# Patient Record
Sex: MALE | Race: WHITE
[De-identification: names, ages, dates, MRNs, and addresses within clinical notes are randomized per-mention and may not be internally consistent; named-entity substitution may affect disease eponyms.]

---

## 2017-03-17 ENCOUNTER — HOSPITAL ENCOUNTER (OUTPATIENT)
Dept: HOSPITAL 83 - ORTHO | Age: 67
Discharge: HOME | End: 2017-03-17
Attending: ORTHOPAEDIC SURGERY
Payer: MEDICARE

## 2017-03-17 DIAGNOSIS — M25.552: ICD-10-CM

## 2017-03-17 DIAGNOSIS — S72.115D: Primary | ICD-10-CM

## 2017-04-28 ENCOUNTER — HOSPITAL ENCOUNTER (OUTPATIENT)
Dept: HOSPITAL 83 - ORTHO | Age: 67
Discharge: HOME | End: 2017-04-28
Attending: ORTHOPAEDIC SURGERY
Payer: MEDICARE

## 2017-04-28 DIAGNOSIS — G20: ICD-10-CM
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
## 2017-04-28 DIAGNOSIS — S72.92XD: Primary | ICD-10-CM

## 2017-05-01 ENCOUNTER — HOSPITAL ENCOUNTER (EMERGENCY)
Dept: HOSPITAL 83 - ED | Age: 67
Discharge: HOME | End: 2017-05-01
Payer: MEDICARE

## 2017-05-01 VITALS — HEIGHT: 60 IN | WEIGHT: 107 LBS

## 2017-05-01 VITALS — SYSTOLIC BLOOD PRESSURE: 113 MMHG | DIASTOLIC BLOOD PRESSURE: 68 MMHG

## 2017-05-01 DIAGNOSIS — Z79.899: ICD-10-CM

## 2017-05-01 DIAGNOSIS — Z91.041: ICD-10-CM

## 2017-05-01 DIAGNOSIS — G89.29: Primary | ICD-10-CM

## 2017-05-01 DIAGNOSIS — M25.561: ICD-10-CM

## 2017-05-01 DIAGNOSIS — Z91.030: ICD-10-CM

## 2017-11-14 ENCOUNTER — HOSPITAL ENCOUNTER (INPATIENT)
Dept: HOSPITAL 83 - ED | Age: 67
Discharge: ANOTHER HOSPITAL | DRG: 871 | End: 2017-11-14
Attending: INTERNAL MEDICINE | Admitting: INTERNAL MEDICINE
Payer: MEDICARE

## 2017-11-14 VITALS
SYSTOLIC BLOOD PRESSURE: 89 MMHG | BODY MASS INDEX: 18.64 KG/M2 | DIASTOLIC BLOOD PRESSURE: 53 MMHG | HEIGHT: 65.98 IN | WEIGHT: 116 LBS

## 2017-11-14 VITALS — DIASTOLIC BLOOD PRESSURE: 44 MMHG | SYSTOLIC BLOOD PRESSURE: 68 MMHG

## 2017-11-14 VITALS — SYSTOLIC BLOOD PRESSURE: 71 MMHG | DIASTOLIC BLOOD PRESSURE: 44 MMHG

## 2017-11-14 VITALS — DIASTOLIC BLOOD PRESSURE: 48 MMHG

## 2017-11-14 VITALS — DIASTOLIC BLOOD PRESSURE: 53 MMHG | SYSTOLIC BLOOD PRESSURE: 89 MMHG

## 2017-11-14 VITALS — DIASTOLIC BLOOD PRESSURE: 50 MMHG | SYSTOLIC BLOOD PRESSURE: 76 MMHG

## 2017-11-14 VITALS — SYSTOLIC BLOOD PRESSURE: 65 MMHG | DIASTOLIC BLOOD PRESSURE: 42 MMHG

## 2017-11-14 VITALS — DIASTOLIC BLOOD PRESSURE: 60 MMHG

## 2017-11-14 VITALS — DIASTOLIC BLOOD PRESSURE: 63 MMHG | SYSTOLIC BLOOD PRESSURE: 101 MMHG

## 2017-11-14 VITALS — DIASTOLIC BLOOD PRESSURE: 42 MMHG | SYSTOLIC BLOOD PRESSURE: 69 MMHG

## 2017-11-14 VITALS — DIASTOLIC BLOOD PRESSURE: 47 MMHG | SYSTOLIC BLOOD PRESSURE: 87 MMHG

## 2017-11-14 VITALS — SYSTOLIC BLOOD PRESSURE: 73 MMHG | DIASTOLIC BLOOD PRESSURE: 39 MMHG

## 2017-11-14 VITALS — SYSTOLIC BLOOD PRESSURE: 85 MMHG | DIASTOLIC BLOOD PRESSURE: 53 MMHG

## 2017-11-14 VITALS — DIASTOLIC BLOOD PRESSURE: 47 MMHG

## 2017-11-14 VITALS — DIASTOLIC BLOOD PRESSURE: 64 MMHG | SYSTOLIC BLOOD PRESSURE: 106 MMHG

## 2017-11-14 VITALS — SYSTOLIC BLOOD PRESSURE: 84 MMHG | DIASTOLIC BLOOD PRESSURE: 46 MMHG

## 2017-11-14 VITALS — DIASTOLIC BLOOD PRESSURE: 68 MMHG | SYSTOLIC BLOOD PRESSURE: 113 MMHG

## 2017-11-14 VITALS — DIASTOLIC BLOOD PRESSURE: 68 MMHG

## 2017-11-14 VITALS — DIASTOLIC BLOOD PRESSURE: 66 MMHG | SYSTOLIC BLOOD PRESSURE: 114 MMHG

## 2017-11-14 DIAGNOSIS — K52.9: ICD-10-CM

## 2017-11-14 DIAGNOSIS — Z91.041: ICD-10-CM

## 2017-11-14 DIAGNOSIS — E80.6: ICD-10-CM

## 2017-11-14 DIAGNOSIS — K56.41: ICD-10-CM

## 2017-11-14 DIAGNOSIS — Z87.81: ICD-10-CM

## 2017-11-14 DIAGNOSIS — N17.0: ICD-10-CM

## 2017-11-14 DIAGNOSIS — Z83.3: ICD-10-CM

## 2017-11-14 DIAGNOSIS — N18.3: ICD-10-CM

## 2017-11-14 DIAGNOSIS — R65.21: ICD-10-CM

## 2017-11-14 DIAGNOSIS — E83.41: ICD-10-CM

## 2017-11-14 DIAGNOSIS — E53.8: ICD-10-CM

## 2017-11-14 DIAGNOSIS — G89.29: ICD-10-CM

## 2017-11-14 DIAGNOSIS — R73.9: ICD-10-CM

## 2017-11-14 DIAGNOSIS — Z91.030: ICD-10-CM

## 2017-11-14 DIAGNOSIS — D64.9: ICD-10-CM

## 2017-11-14 DIAGNOSIS — R29.6: ICD-10-CM

## 2017-11-14 DIAGNOSIS — N39.0: ICD-10-CM

## 2017-11-14 DIAGNOSIS — Z82.49: ICD-10-CM

## 2017-11-14 DIAGNOSIS — G20: ICD-10-CM

## 2017-11-14 DIAGNOSIS — I95.89: ICD-10-CM

## 2017-11-14 DIAGNOSIS — A41.9: Primary | ICD-10-CM

## 2017-11-14 DIAGNOSIS — R31.9: ICD-10-CM

## 2017-11-14 DIAGNOSIS — G93.41: ICD-10-CM

## 2017-11-14 DIAGNOSIS — Z79.899: ICD-10-CM

## 2017-11-14 LAB
ALBUMIN SERPL-MCNC: 3.3 GM/DL (ref 3.1–4.5)
ALP SERPL-CCNC: 55 U/L (ref 45–117)
ALT SERPL W P-5'-P-CCNC: 11 U/L (ref 12–78)
AMPHETAMINES UR QL SCN: < 1000
APPEARANCE UR: (no result)
AST SERPL-CCNC: 44 IU/L (ref 3–35)
BACTERIA #/AREA URNS HPF: (no result) /[HPF]
BARBITURATES UR QL SCN: < 200
BENZODIAZ UR QL SCN: < 200
BILIRUB UR QL STRIP: (no result)
BUN SERPL-MCNC: 31 MG/DL (ref 7–24)
BZE UR QL SCN: < 300
CANNABINOIDS UR QL SCN: < 50
CASTS URNS QL MICRO: (no result)
CHLORIDE SERPL-SCNC: 106 MMOL/L (ref 98–107)
COLOR UR: YELLOW
CREAT SERPL-MCNC: 2.25 MG/DL (ref 0.7–1.3)
ERYTHROCYTE [DISTWIDTH] IN BLOOD BY AUTOMATED COUNT: 13.1 % (ref 0–14.5)
GLUCOSE UR QL: NEGATIVE
HCT VFR BLD AUTO: 32.6 % (ref 42–52)
HGB BLD-MCNC: 11 G/DL (ref 14–18)
HGB UR QL STRIP: (no result)
INR BLD: 1.2 (ref 2–3.5)
KETONES UR QL STRIP: (no result)
LEUKOCYTE ESTERASE UR QL STRIP: (no result)
LIPASE SERPL-CCNC: 86 U/L (ref 73–393)
MCH RBC QN AUTO: 32 PG (ref 27–31)
MCHC RBC AUTO-ENTMCNC: 33.7 G/DL (ref 33–37)
MCV RBC AUTO: 94.8 FL (ref 80–94)
METHADONE UR QL SCN: < 300
NITRITE UR QL STRIP: POSITIVE
NRBC BLD QL AUTO: 0 10*3/UL (ref 0–0)
OPIATES UR QL SCN: < 300
PCP UR QL SCN: <  25
PH UR STRIP: 5 [PH] (ref 5–9)
PLATELET # BLD AUTO: 153 10*3/UL (ref 130–400)
PLATELET SUFFICIENCY: NORMAL
PMV BLD AUTO: 10.2 FL (ref 9.6–12.3)
POTASSIUM SERPL-SCNC: 4.1 MMOL/L (ref 3.5–5.1)
PROT SERPL-MCNC: 7.2 GM/DL (ref 6.4–8.2)
RBC # BLD AUTO: 3.44 10*6/UL (ref 4.5–5.9)
RBC #/AREA URNS HPF: (no result) RBC/HPF (ref 0–2)
RBC MORPH BLD: NORMAL
SODIUM SERPL-SCNC: 139 MMOL/L (ref 136–145)
SP GR UR: 1.02 (ref 1–1.03)
TOTAL CELLS COUNTED: 100 #CELLS
TROPONIN I SERPL-MCNC: < 0.015 NG/ML (ref ?–0.04)
UROBILINOGEN UR STRIP-MCNC: 2 E.U./DL (ref 0.2–1)
WBC #/AREA URNS HPF: (no result) WBC/HPF (ref 0–5)
WBC NRBC COR # BLD AUTO: 15.7 10*3/UL (ref 4.8–10.8)

## 2017-11-14 NOTE — NUR
RECEIVED CALL FROM [facility] WANTING A HISTORY ON PATIENT, DUE TO HIM BEING
TRANSFERED UP THERE. WAS UNAWARE OF THE TRANSFER, CALLED DR. KIM ON OTHER
LINE. HE STATED YEA TRANSFER PATIENT. PATIENT WAS LYING ON ER CART NOT EVEN IN
ROOM YET. CONTACTED DILCIA, SHE STATED WE HAD TO ADMIT PATIENT THEN TRANSFER.
GAVE NANCY BRIEF HISTORY, AND HE WILL CALL BACK WITH BED.

## 2017-11-14 NOTE — NUR
PATIENT LEFT FLOOR VIA LIFE TEAM, FOR [place] [facility] [place]

## 2017-11-14 NOTE — NUR
A 67 YEAR OLD MALE admitted to ICCU, under the
services of KENNY Arora DO with a diagnosis of SEPSIS RELATED
HYPOTENSION,COLITIS,PARKINSONS.
Chief complaint is ABD PAIN .
Patient arrived via stretcher from ER.
Monitor applied. Initial assessment completed.
Vital signs taken and recorded.
KENNY ARORA DO notified of admission to the unit.
Orders received.
See assessment for past medical history, medications
and allergies.
Patient and/or family oriented to unit. [facility]U
visitation policy reviewed.
Clothing/patient valuable form completed.
 
STEVE BRITTON

## 2017-11-14 NOTE — EKG
[place], [place]
 
                               ELECTROCARDIOGRAM REPORT
 
        NAME: BRITTNY SELLERS                 ACCT #: [id number]  
        UNIT #: [id number]                        ROOM: [facility]    
        DOCTOR: KRYSTAL ANDRADE,PEDRO                BIRTHDATE: [birth year]
 
 
 

 
 
DOS: 11/14/2017
 
TIME:  0121 hours.
 
Normal sinus rhythm at 97 beats per minute.
Low voltage in limb leads.
The tracing is otherwise normal.
No previous tracing is available for comparison.
 
 
 
_________________________________
PEDRO RICE MD
 
CM:EKGRPT:ELECTROCARDIOGRAM REPORT
 
D: 11/15/17 1124
T: 11/15/17 1215
    
                                                            
PEDRO RICE MD

## 2017-11-29 ENCOUNTER — HOSPITAL ENCOUNTER (OUTPATIENT)
Dept: HOSPITAL 83 - LAB | Age: 67
Discharge: HOME | End: 2017-11-29
Attending: FAMILY MEDICINE
Payer: MEDICARE

## 2017-11-29 DIAGNOSIS — K59.03: Primary | ICD-10-CM

## 2017-11-29 LAB
ALBUMIN SERPL-MCNC: 3.7 GM/DL (ref 3.1–4.5)
ALP SERPL-CCNC: 64 U/L (ref 45–117)
ALT SERPL W P-5'-P-CCNC: 8 U/L (ref 12–78)
AST SERPL-CCNC: 15 IU/L (ref 3–35)
BASOPHILS # BLD AUTO: 0 10*3/UL (ref 0–0.1)
BASOPHILS NFR BLD AUTO: 0.6 % (ref 0–1)
BUN SERPL-MCNC: 20 MG/DL (ref 7–24)
CHLORIDE SERPL-SCNC: 104 MMOL/L (ref 98–107)
CREAT SERPL-MCNC: 1.62 MG/DL (ref 0.7–1.3)
EOSINOPHIL # BLD AUTO: 0.2 10*3/UL (ref 0–0.4)
EOSINOPHIL # BLD AUTO: 2.4 % (ref 1–4)
ERYTHROCYTE [DISTWIDTH] IN BLOOD BY AUTOMATED COUNT: 12.9 % (ref 0–14.5)
HCT VFR BLD AUTO: 34.5 % (ref 42–52)
HGB BLD-MCNC: 11.1 G/DL (ref 14–18)
LYMPHOCYTES # BLD AUTO: 1.1 10*3/UL (ref 1.3–4.4)
LYMPHOCYTES NFR BLD AUTO: 18 % (ref 27–41)
MCH RBC QN AUTO: 31.2 PG (ref 27–31)
MCHC RBC AUTO-ENTMCNC: 32.2 G/DL (ref 33–37)
MCV RBC AUTO: 96.9 FL (ref 80–94)
MONOCYTES # BLD AUTO: 0.7 10*3/UL (ref 0.1–1)
MONOCYTES NFR BLD MANUAL: 10.9 % (ref 3–9)
NEUT #: 4.3 10*3/UL (ref 2.3–7.9)
NEUT %: 67.8 % (ref 47–73)
NRBC BLD QL AUTO: 0 % (ref 0–0)
PLATELET # BLD AUTO: 343 10*3/UL (ref 130–400)
PMV BLD AUTO: 10.5 FL (ref 9.6–12.3)
POTASSIUM SERPL-SCNC: 4.4 MMOL/L (ref 3.5–5.1)
PROT SERPL-MCNC: 8.1 GM/DL (ref 6.4–8.2)
RBC # BLD AUTO: 3.56 10*6/UL (ref 4.5–5.9)
SODIUM SERPL-SCNC: 137 MMOL/L (ref 136–145)
WBC NRBC COR # BLD AUTO: 6.3 10*3/UL (ref 4.8–10.8)

## 2017-12-06 ENCOUNTER — HOSPITAL ENCOUNTER (EMERGENCY)
Dept: HOSPITAL 83 - ED | Age: 67
Discharge: HOME | End: 2017-12-06
Payer: MEDICARE

## 2017-12-06 VITALS — WEIGHT: 120 LBS | BODY MASS INDEX: 19.29 KG/M2 | HEIGHT: 65.98 IN

## 2017-12-06 VITALS — SYSTOLIC BLOOD PRESSURE: 106 MMHG | DIASTOLIC BLOOD PRESSURE: 54 MMHG

## 2017-12-06 DIAGNOSIS — Z79.899: ICD-10-CM

## 2017-12-06 DIAGNOSIS — E80.6: ICD-10-CM

## 2017-12-06 DIAGNOSIS — R73.9: ICD-10-CM

## 2017-12-06 DIAGNOSIS — N18.3: ICD-10-CM

## 2017-12-06 DIAGNOSIS — I95.9: ICD-10-CM

## 2017-12-06 DIAGNOSIS — E83.41: ICD-10-CM

## 2017-12-06 DIAGNOSIS — Z91.030: ICD-10-CM

## 2017-12-06 DIAGNOSIS — Z91.041: ICD-10-CM

## 2017-12-06 DIAGNOSIS — K59.00: Primary | ICD-10-CM

## 2017-12-06 DIAGNOSIS — G89.29: ICD-10-CM

## 2017-12-06 DIAGNOSIS — G20: ICD-10-CM

## 2017-12-06 LAB
ALBUMIN SERPL-MCNC: 3.7 GM/DL (ref 3.1–4.5)
ALP SERPL-CCNC: 60 U/L (ref 45–117)
ALT SERPL W P-5'-P-CCNC: 8 U/L (ref 12–78)
APTT PPP: 26.7 SECONDS (ref 20.8–31.5)
AST SERPL-CCNC: 10 IU/L (ref 3–35)
BASOPHILS # BLD AUTO: 0 10*3/UL (ref 0–0.1)
BASOPHILS NFR BLD AUTO: 0.3 % (ref 0–1)
BUN SERPL-MCNC: 20 MG/DL (ref 7–24)
CHLORIDE SERPL-SCNC: 106 MMOL/L (ref 98–107)
CREAT SERPL-MCNC: 1.54 MG/DL (ref 0.7–1.3)
EOSINOPHIL # BLD AUTO: 0.1 10*3/UL (ref 0–0.4)
EOSINOPHIL # BLD AUTO: 2.1 % (ref 1–4)
ERYTHROCYTE [DISTWIDTH] IN BLOOD BY AUTOMATED COUNT: 12.8 % (ref 0–14.5)
HCT VFR BLD AUTO: 32.7 % (ref 42–52)
HGB BLD-MCNC: 10.7 G/DL (ref 14–18)
INR BLD: 1.1 (ref 2–3.5)
LIPASE SERPL-CCNC: 136 U/L (ref 73–393)
LYMPHOCYTES # BLD AUTO: 1.3 10*3/UL (ref 1.3–4.4)
LYMPHOCYTES NFR BLD AUTO: 19.6 % (ref 27–41)
MCH RBC QN AUTO: 31.4 PG (ref 27–31)
MCHC RBC AUTO-ENTMCNC: 32.7 G/DL (ref 33–37)
MCV RBC AUTO: 95.9 FL (ref 80–94)
MONOCYTES # BLD AUTO: 0.7 10*3/UL (ref 0.1–1)
MONOCYTES NFR BLD MANUAL: 9.7 % (ref 3–9)
NEUT #: 4.6 10*3/UL (ref 2.3–7.9)
NEUT %: 67.7 % (ref 47–73)
NRBC BLD QL AUTO: 0 % (ref 0–0)
PLATELET # BLD AUTO: 254 10*3/UL (ref 130–400)
PMV BLD AUTO: 10.2 FL (ref 9.6–12.3)
POTASSIUM SERPL-SCNC: 4.3 MMOL/L (ref 3.5–5.1)
PROT SERPL-MCNC: 7.7 GM/DL (ref 6.4–8.2)
RBC # BLD AUTO: 3.41 10*6/UL (ref 4.5–5.9)
SODIUM SERPL-SCNC: 141 MMOL/L (ref 136–145)
WBC NRBC COR # BLD AUTO: 6.8 10*3/UL (ref 4.8–10.8)

## 2018-01-15 ENCOUNTER — HOSPITAL ENCOUNTER (INPATIENT)
Dept: HOSPITAL 83 - ED | Age: 68
LOS: 3 days | Discharge: OTHER INSTITUTION | DRG: 552 | End: 2018-01-18
Attending: EMERGENCY MEDICINE | Admitting: EMERGENCY MEDICINE
Payer: MEDICARE

## 2018-01-15 VITALS — DIASTOLIC BLOOD PRESSURE: 57 MMHG | SYSTOLIC BLOOD PRESSURE: 108 MMHG

## 2018-01-15 VITALS — DIASTOLIC BLOOD PRESSURE: 57 MMHG | SYSTOLIC BLOOD PRESSURE: 99 MMHG

## 2018-01-15 VITALS — DIASTOLIC BLOOD PRESSURE: 73 MMHG | SYSTOLIC BLOOD PRESSURE: 102 MMHG

## 2018-01-15 VITALS — DIASTOLIC BLOOD PRESSURE: 75 MMHG

## 2018-01-15 VITALS — SYSTOLIC BLOOD PRESSURE: 102 MMHG | DIASTOLIC BLOOD PRESSURE: 58 MMHG

## 2018-01-15 VITALS — WEIGHT: 103 LBS | HEIGHT: 66.97 IN | BODY MASS INDEX: 16.17 KG/M2

## 2018-01-15 VITALS — DIASTOLIC BLOOD PRESSURE: 59 MMHG | SYSTOLIC BLOOD PRESSURE: 106 MMHG

## 2018-01-15 VITALS — DIASTOLIC BLOOD PRESSURE: 58 MMHG | SYSTOLIC BLOOD PRESSURE: 156 MMHG

## 2018-01-15 VITALS — SYSTOLIC BLOOD PRESSURE: 106 MMHG | DIASTOLIC BLOOD PRESSURE: 59 MMHG

## 2018-01-15 VITALS — DIASTOLIC BLOOD PRESSURE: 66 MMHG

## 2018-01-15 DIAGNOSIS — G20: ICD-10-CM

## 2018-01-15 DIAGNOSIS — Z91.041: ICD-10-CM

## 2018-01-15 DIAGNOSIS — M25.569: ICD-10-CM

## 2018-01-15 DIAGNOSIS — Z91.030: ICD-10-CM

## 2018-01-15 DIAGNOSIS — K59.00: ICD-10-CM

## 2018-01-15 DIAGNOSIS — D53.9: ICD-10-CM

## 2018-01-15 DIAGNOSIS — Z79.899: ICD-10-CM

## 2018-01-15 DIAGNOSIS — E83.39: ICD-10-CM

## 2018-01-15 DIAGNOSIS — R62.7: ICD-10-CM

## 2018-01-15 DIAGNOSIS — E44.0: ICD-10-CM

## 2018-01-15 DIAGNOSIS — D72.829: ICD-10-CM

## 2018-01-15 DIAGNOSIS — R26.2: ICD-10-CM

## 2018-01-15 DIAGNOSIS — Z82.49: ICD-10-CM

## 2018-01-15 DIAGNOSIS — G89.29: ICD-10-CM

## 2018-01-15 DIAGNOSIS — E86.0: ICD-10-CM

## 2018-01-15 DIAGNOSIS — L89.301: ICD-10-CM

## 2018-01-15 DIAGNOSIS — Z83.3: ICD-10-CM

## 2018-01-15 DIAGNOSIS — M54.5: Primary | ICD-10-CM

## 2018-01-15 DIAGNOSIS — N18.3: ICD-10-CM

## 2018-01-15 LAB
ALBUMIN SERPL-MCNC: 3.2 GM/DL (ref 3.1–4.5)
ALP SERPL-CCNC: 70 U/L (ref 45–117)
ALT SERPL W P-5'-P-CCNC: 7 U/L (ref 12–78)
APPEARANCE UR: (no result)
AST SERPL-CCNC: 9 IU/L (ref 3–35)
BASOPHILS # BLD AUTO: 0 10*3/UL (ref 0–0.1)
BASOPHILS NFR BLD AUTO: 0.2 % (ref 0–1)
BILIRUB UR QL STRIP: NEGATIVE
BUN SERPL-MCNC: 17 MG/DL (ref 7–24)
CASTS URNS QL MICRO: (no result)
CHLORIDE SERPL-SCNC: 107 MMOL/L (ref 98–107)
COLOR UR: YELLOW
CREAT SERPL-MCNC: 1.5 MG/DL (ref 0.7–1.3)
EOSINOPHIL # BLD AUTO: 0 10*3/UL (ref 0–0.4)
EOSINOPHIL # BLD AUTO: 0.2 % (ref 1–4)
ERYTHROCYTE [DISTWIDTH] IN BLOOD BY AUTOMATED COUNT: 12.6 % (ref 0–14.5)
GLUCOSE UR QL: NEGATIVE
HCT VFR BLD AUTO: 30.6 % (ref 42–52)
HGB BLD-MCNC: 10.1 G/DL (ref 14–18)
HGB UR QL STRIP: NEGATIVE
KETONES UR QL STRIP: NEGATIVE
LEUKOCYTE ESTERASE UR QL STRIP: NEGATIVE
LYMPHOCYTES # BLD AUTO: 0.8 10*3/UL (ref 1.3–4.4)
LYMPHOCYTES NFR BLD AUTO: 5.9 % (ref 27–41)
MCH RBC QN AUTO: 30.7 PG (ref 27–31)
MCHC RBC AUTO-ENTMCNC: 33 G/DL (ref 33–37)
MCV RBC AUTO: 93 FL (ref 80–94)
MONOCYTES # BLD AUTO: 1 10*3/UL (ref 0.1–1)
MONOCYTES NFR BLD MANUAL: 7.5 % (ref 3–9)
MUCOUS THREADS URNS QL MICRO: (no result)
NEUT #: 10.9 10*3/UL (ref 2.3–7.9)
NEUT %: 85.9 % (ref 47–73)
NITRITE UR QL STRIP: NEGATIVE
NRBC BLD QL AUTO: 0 % (ref 0–0)
PH UR STRIP: 7 [PH] (ref 5–9)
PLATELET # BLD AUTO: 301 10*3/UL (ref 130–400)
PMV BLD AUTO: 10 FL (ref 9.6–12.3)
POTASSIUM SERPL-SCNC: 4.2 MMOL/L (ref 3.5–5.1)
PROT SERPL-MCNC: 7.5 GM/DL (ref 6.4–8.2)
RBC # BLD AUTO: 3.29 10*6/UL (ref 4.5–5.9)
RBC #/AREA URNS HPF: (no result) RBC/HPF (ref 0–2)
SODIUM SERPL-SCNC: 140 MMOL/L (ref 136–145)
SP GR UR: 1.01 (ref 1–1.03)
UROBILINOGEN UR STRIP-MCNC: 0.2 E.U./DL (ref 0.2–1)
WBC #/AREA URNS HPF: (no result) WBC/HPF (ref 0–5)
WBC NRBC COR # BLD AUTO: 12.7 10*3/UL (ref 4.8–10.8)

## 2018-01-16 VITALS — DIASTOLIC BLOOD PRESSURE: 71 MMHG

## 2018-01-16 VITALS — DIASTOLIC BLOOD PRESSURE: 68 MMHG | SYSTOLIC BLOOD PRESSURE: 118 MMHG

## 2018-01-16 VITALS — DIASTOLIC BLOOD PRESSURE: 70 MMHG | SYSTOLIC BLOOD PRESSURE: 92 MMHG

## 2018-01-16 VITALS — SYSTOLIC BLOOD PRESSURE: 106 MMHG | DIASTOLIC BLOOD PRESSURE: 70 MMHG

## 2018-01-16 VITALS — SYSTOLIC BLOOD PRESSURE: 127 MMHG | DIASTOLIC BLOOD PRESSURE: 72 MMHG

## 2018-01-16 LAB
25(OH)D3 SERPL-MCNC: 9 NG/ML (ref 30–100)
ALBUMIN SERPL-MCNC: 2.8 GM/DL (ref 3.1–4.5)
ALP SERPL-CCNC: 60 U/L (ref 45–117)
ALT SERPL W P-5'-P-CCNC: 7 U/L (ref 12–78)
AST SERPL-CCNC: 8 IU/L (ref 3–35)
BASOPHILS # BLD AUTO: 0 10*3/UL (ref 0–0.1)
BASOPHILS NFR BLD AUTO: 0.2 % (ref 0–1)
BUN SERPL-MCNC: 15 MG/DL (ref 7–24)
CHLORIDE SERPL-SCNC: 109 MMOL/L (ref 98–107)
CHOLEST SERPL-MCNC: 90 MG/DL (ref ?–200)
CREAT SERPL-MCNC: 1.24 MG/DL (ref 0.7–1.3)
EOSINOPHIL # BLD AUTO: 0 10*3/UL (ref 0–0.4)
EOSINOPHIL # BLD AUTO: 0.2 % (ref 1–4)
ERYTHROCYTE [DISTWIDTH] IN BLOOD BY AUTOMATED COUNT: 12.8 % (ref 0–14.5)
HCT VFR BLD AUTO: 27.6 % (ref 42–52)
HDLC SERPL-MCNC: 53 MG/DL (ref 40–60)
HGB BLD-MCNC: 9 G/DL (ref 14–18)
LDLC SERPL DIRECT ASSAY-MCNC: 26 MG/DL (ref 9–159)
LYMPHOCYTES # BLD AUTO: 1 10*3/UL (ref 1.3–4.4)
LYMPHOCYTES NFR BLD AUTO: 7.9 % (ref 27–41)
MCH RBC QN AUTO: 30.8 PG (ref 27–31)
MCHC RBC AUTO-ENTMCNC: 32.6 G/DL (ref 33–37)
MCV RBC AUTO: 94.5 FL (ref 80–94)
MONOCYTES # BLD AUTO: 1 10*3/UL (ref 0.1–1)
MONOCYTES NFR BLD MANUAL: 8.3 % (ref 3–9)
NEUT #: 10.3 10*3/UL (ref 2.3–7.9)
NEUT %: 83 % (ref 47–73)
NRBC BLD QL AUTO: 0 10*3/UL (ref 0–0)
PHOSPHATE SERPL-MCNC: 2.1 MG/DL (ref 2.5–4.9)
PLATELET # BLD AUTO: 223 10*3/UL (ref 130–400)
PMV BLD AUTO: 10.3 FL (ref 9.6–12.3)
POTASSIUM SERPL-SCNC: 4 MMOL/L (ref 3.5–5.1)
PROT SERPL-MCNC: 6.8 GM/DL (ref 6.4–8.2)
RBC # BLD AUTO: 2.92 10*6/UL (ref 4.5–5.9)
SODIUM SERPL-SCNC: 140 MMOL/L (ref 136–145)
T4 FREE SERPL-MCNC: 1.07 NG/DL (ref 0.76–1.46)
TRIGL SERPL-MCNC: 55 MG/DL (ref ?–150)
TSH SERPL DL<=0.005 MIU/L-ACNC: 1.04 UIU/ML (ref 0.36–4.75)
VITAMIN B12: 282 PG/ML (ref 247–911)
VLDLC SERPL CALC-MCNC: 11 MG/DL (ref 6–40)
WBC NRBC COR # BLD AUTO: 12.4 10*3/UL (ref 4.8–10.8)

## 2018-01-17 VITALS — SYSTOLIC BLOOD PRESSURE: 100 MMHG | DIASTOLIC BLOOD PRESSURE: 58 MMHG

## 2018-01-17 VITALS — DIASTOLIC BLOOD PRESSURE: 65 MMHG

## 2018-01-17 VITALS — DIASTOLIC BLOOD PRESSURE: 60 MMHG

## 2018-01-17 VITALS — SYSTOLIC BLOOD PRESSURE: 108 MMHG | DIASTOLIC BLOOD PRESSURE: 60 MMHG

## 2018-01-17 VITALS — DIASTOLIC BLOOD PRESSURE: 66 MMHG

## 2018-01-18 VITALS — DIASTOLIC BLOOD PRESSURE: 78 MMHG

## 2018-01-18 VITALS — SYSTOLIC BLOOD PRESSURE: 105 MMHG | DIASTOLIC BLOOD PRESSURE: 65 MMHG

## 2018-05-01 ENCOUNTER — HOSPITAL ENCOUNTER (INPATIENT)
Dept: HOSPITAL 83 - ED | Age: 68
LOS: 1 days | Discharge: HOME | DRG: 640 | End: 2018-05-02
Attending: INTERNAL MEDICINE | Admitting: INTERNAL MEDICINE
Payer: MEDICARE

## 2018-05-01 VITALS — DIASTOLIC BLOOD PRESSURE: 69 MMHG | SYSTOLIC BLOOD PRESSURE: 136 MMHG

## 2018-05-01 VITALS
BODY MASS INDEX: 26.68 KG/M2 | SYSTOLIC BLOOD PRESSURE: 113 MMHG | DIASTOLIC BLOOD PRESSURE: 83 MMHG | HEIGHT: 66.97 IN | WEIGHT: 170 LBS

## 2018-05-01 VITALS — DIASTOLIC BLOOD PRESSURE: 66 MMHG

## 2018-05-01 VITALS — DIASTOLIC BLOOD PRESSURE: 47 MMHG

## 2018-05-01 VITALS — DIASTOLIC BLOOD PRESSURE: 72 MMHG

## 2018-05-01 VITALS — DIASTOLIC BLOOD PRESSURE: 59 MMHG | SYSTOLIC BLOOD PRESSURE: 98 MMHG

## 2018-05-01 VITALS — SYSTOLIC BLOOD PRESSURE: 133 MMHG | DIASTOLIC BLOOD PRESSURE: 74 MMHG

## 2018-05-01 VITALS — DIASTOLIC BLOOD PRESSURE: 58 MMHG

## 2018-05-01 DIAGNOSIS — R47.1: ICD-10-CM

## 2018-05-01 DIAGNOSIS — G20: ICD-10-CM

## 2018-05-01 DIAGNOSIS — R26.2: ICD-10-CM

## 2018-05-01 DIAGNOSIS — G89.29: ICD-10-CM

## 2018-05-01 DIAGNOSIS — N18.3: ICD-10-CM

## 2018-05-01 DIAGNOSIS — E86.0: Primary | ICD-10-CM

## 2018-05-01 DIAGNOSIS — Z87.440: ICD-10-CM

## 2018-05-01 DIAGNOSIS — D72.810: ICD-10-CM

## 2018-05-01 DIAGNOSIS — Z83.3: ICD-10-CM

## 2018-05-01 DIAGNOSIS — Z82.49: ICD-10-CM

## 2018-05-01 DIAGNOSIS — Z91.041: ICD-10-CM

## 2018-05-01 DIAGNOSIS — Z84.89: ICD-10-CM

## 2018-05-01 DIAGNOSIS — E55.9: ICD-10-CM

## 2018-05-01 DIAGNOSIS — N17.0: ICD-10-CM

## 2018-05-01 DIAGNOSIS — E44.0: ICD-10-CM

## 2018-05-01 DIAGNOSIS — Z91.81: ICD-10-CM

## 2018-05-01 DIAGNOSIS — E53.8: ICD-10-CM

## 2018-05-01 DIAGNOSIS — M25.569: ICD-10-CM

## 2018-05-01 DIAGNOSIS — Z79.899: ICD-10-CM

## 2018-05-01 DIAGNOSIS — D64.9: ICD-10-CM

## 2018-05-01 DIAGNOSIS — E87.8: ICD-10-CM

## 2018-05-01 DIAGNOSIS — Z91.030: ICD-10-CM

## 2018-05-01 LAB
ALBUMIN SERPL-MCNC: 3.1 GM/DL (ref 3.1–4.5)
ALP SERPL-CCNC: 54 U/L (ref 45–117)
ALT SERPL W P-5'-P-CCNC: < 6 U/L (ref 12–78)
APPEARANCE UR: CLEAR
APTT PPP: 27.9 SECONDS (ref 20.8–31.5)
AST SERPL-CCNC: 6 IU/L (ref 3–35)
BASOPHILS # BLD AUTO: 0 10*3/UL (ref 0–0.1)
BASOPHILS NFR BLD AUTO: 0.2 % (ref 0–1)
BILIRUB UR QL STRIP: NEGATIVE
BUN SERPL-MCNC: 24 MG/DL (ref 7–24)
CHLORIDE SERPL-SCNC: 109 MMOL/L (ref 98–107)
COLOR UR: YELLOW
CREAT SERPL-MCNC: 1.52 MG/DL (ref 0.7–1.3)
EOSINOPHIL # BLD AUTO: 0.2 10*3/UL (ref 0–0.4)
EOSINOPHIL # BLD AUTO: 2.9 % (ref 1–4)
ERYTHROCYTE [DISTWIDTH] IN BLOOD BY AUTOMATED COUNT: 12.9 % (ref 0–14.5)
GLUCOSE UR QL: NEGATIVE
HCT VFR BLD AUTO: 30.9 % (ref 42–52)
HGB BLD-MCNC: 10.1 G/DL (ref 14–18)
HGB UR QL STRIP: (no result)
INR BLD: 1.1 (ref 2–3.5)
KETONES UR QL STRIP: NEGATIVE
LEUKOCYTE ESTERASE UR QL STRIP: NEGATIVE
LYMPHOCYTES # BLD AUTO: 1.3 10*3/UL (ref 1.3–4.4)
LYMPHOCYTES NFR BLD AUTO: 21.5 % (ref 27–41)
MCH RBC QN AUTO: 30.7 PG (ref 27–31)
MCHC RBC AUTO-ENTMCNC: 32.7 G/DL (ref 33–37)
MCV RBC AUTO: 93.9 FL (ref 80–94)
MONOCYTES # BLD AUTO: 0.6 10*3/UL (ref 0.1–1)
MONOCYTES NFR BLD MANUAL: 11 % (ref 3–9)
NEUT #: 3.7 10*3/UL (ref 2.3–7.9)
NEUT %: 63.9 % (ref 47–73)
NITRITE UR QL STRIP: NEGATIVE
NRBC BLD QL AUTO: 0 % (ref 0–0)
PH UR STRIP: 6.5 [PH] (ref 5–9)
PLATELET # BLD AUTO: 235 10*3/UL (ref 130–400)
PMV BLD AUTO: 10.1 FL (ref 9.6–12.3)
POTASSIUM SERPL-SCNC: 3.9 MMOL/L (ref 3.5–5.1)
PROT SERPL-MCNC: 6.7 GM/DL (ref 6.4–8.2)
RBC # BLD AUTO: 3.29 10*6/UL (ref 4.5–5.9)
RBC #/AREA URNS HPF: (no result) RBC/HPF (ref 0–2)
SODIUM SERPL-SCNC: 141 MMOL/L (ref 136–145)
SP GR UR: 1.02 (ref 1–1.03)
TROPONIN I SERPL-MCNC: < 0.015 NG/ML (ref ?–0.04)
TSH SERPL DL<=0.005 MIU/L-ACNC: 1.64 UIU/ML (ref 0.36–4.75)
UROBILINOGEN UR STRIP-MCNC: 0.2 E.U./DL (ref 0.2–1)
WBC #/AREA URNS HPF: (no result) WBC/HPF (ref 0–5)
WBC NRBC COR # BLD AUTO: 5.8 10*3/UL (ref 4.8–10.8)

## 2018-05-02 VITALS — SYSTOLIC BLOOD PRESSURE: 133 MMHG | DIASTOLIC BLOOD PRESSURE: 89 MMHG

## 2018-05-02 VITALS — SYSTOLIC BLOOD PRESSURE: 151 MMHG | DIASTOLIC BLOOD PRESSURE: 79 MMHG

## 2018-05-02 VITALS — DIASTOLIC BLOOD PRESSURE: 76 MMHG

## 2018-05-02 LAB
25(OH)D3 SERPL-MCNC: 32.9 NG/ML (ref 30–100)
APPEARANCE UR: (no result)
BACTERIA #/AREA URNS HPF: (no result) /[HPF]
BASOPHILS # BLD AUTO: 0 10*3/UL (ref 0–0.1)
BASOPHILS NFR BLD AUTO: 0.5 % (ref 0–1)
BILIRUB UR QL STRIP: NEGATIVE
BUN SERPL-MCNC: 13 MG/DL (ref 7–24)
CHLORIDE SERPL-SCNC: 108 MMOL/L (ref 98–107)
CHOLEST SERPL-MCNC: 113 MG/DL (ref ?–200)
COLOR UR: YELLOW
CREAT SERPL-MCNC: 1.24 MG/DL (ref 0.7–1.3)
EOSINOPHIL # BLD AUTO: 0.1 10*3/UL (ref 0–0.4)
EOSINOPHIL # BLD AUTO: 2.2 % (ref 1–4)
EPI CELLS #/AREA URNS HPF: (no result) /[HPF]
ERYTHROCYTE [DISTWIDTH] IN BLOOD BY AUTOMATED COUNT: 12.9 % (ref 0–14.5)
GLUCOSE UR QL: NEGATIVE
HCT VFR BLD AUTO: 31.9 % (ref 42–52)
HDLC SERPL-MCNC: 45 MG/DL (ref 40–60)
HGB BLD-MCNC: 10.3 G/DL (ref 14–18)
HGB UR QL STRIP: NEGATIVE
IRON SERPL-MCNC: 77 UG/DL (ref 65–175)
KETONES UR QL STRIP: NEGATIVE
LDLC SERPL DIRECT ASSAY-MCNC: 53 MG/DL (ref 9–159)
LEUKOCYTE ESTERASE UR QL STRIP: NEGATIVE
LYMPHOCYTES # BLD AUTO: 1.2 10*3/UL (ref 1.3–4.4)
LYMPHOCYTES NFR BLD AUTO: 22.4 % (ref 27–41)
MCH RBC QN AUTO: 30.5 PG (ref 27–31)
MCHC RBC AUTO-ENTMCNC: 32.3 G/DL (ref 33–37)
MCV RBC AUTO: 94.4 FL (ref 80–94)
MONOCYTES # BLD AUTO: 0.5 10*3/UL (ref 0.1–1)
MONOCYTES NFR BLD MANUAL: 8.9 % (ref 3–9)
MUCOUS THREADS URNS QL MICRO: (no result)
NEUT #: 3.6 10*3/UL (ref 2.3–7.9)
NEUT %: 65.5 % (ref 47–73)
NITRITE UR QL STRIP: NEGATIVE
NRBC BLD QL AUTO: 0 % (ref 0–0)
PH UR STRIP: 7 [PH] (ref 5–9)
PHOSPHATE SERPL-MCNC: 2.5 MG/DL (ref 2.5–4.9)
PLATELET # BLD AUTO: 200 10*3/UL (ref 130–400)
PMV BLD AUTO: 10.2 FL (ref 9.6–12.3)
POTASSIUM SERPL-SCNC: 4.2 MMOL/L (ref 3.5–5.1)
RBC # BLD AUTO: 3.38 10*6/UL (ref 4.5–5.9)
RBC #/AREA URNS HPF: (no result) RBC/HPF (ref 0–2)
SODIUM SERPL-SCNC: 140 MMOL/L (ref 136–145)
SP GR UR: <= 1.005 (ref 1–1.03)
TIBC SERPL-MCNC: 275 UG/DL (ref 250–450)
TRIGL SERPL-MCNC: 77 MG/DL (ref ?–150)
UROBILINOGEN UR STRIP-MCNC: 0.2 E.U./DL (ref 0.2–1)
VITAMIN B12: 276 PG/ML (ref 247–911)
VLDLC SERPL CALC-MCNC: 15 MG/DL (ref 6–40)
WBC #/AREA URNS HPF: (no result) WBC/HPF (ref 0–5)
WBC NRBC COR # BLD AUTO: 5.5 10*3/UL (ref 4.8–10.8)

## 2018-11-20 ENCOUNTER — HOSPITAL ENCOUNTER (OUTPATIENT)
Dept: HOSPITAL 83 - LAB | Age: 68
Discharge: HOME | End: 2018-11-20
Payer: MEDICARE

## 2018-11-20 DIAGNOSIS — Z86.2: ICD-10-CM

## 2018-11-20 DIAGNOSIS — Z01.818: Primary | ICD-10-CM

## 2018-11-20 DIAGNOSIS — G20: ICD-10-CM

## 2018-11-20 LAB
APTT PPP: 26.7 SECONDS (ref 20.8–31.5)
BASOPHILS # BLD AUTO: 0 10*3/UL (ref 0–0.1)
BASOPHILS NFR BLD AUTO: 0.4 % (ref 0–1)
BUN SERPL-MCNC: 19 MG/DL (ref 7–24)
CHLORIDE SERPL-SCNC: 105 MMOL/L (ref 98–107)
CREAT SERPL-MCNC: 1.63 MG/DL (ref 0.7–1.3)
EOSINOPHIL # BLD AUTO: 0.1 10*3/UL (ref 0–0.4)
EOSINOPHIL # BLD AUTO: 1.3 % (ref 1–4)
ERYTHROCYTE [DISTWIDTH] IN BLOOD BY AUTOMATED COUNT: 12.7 % (ref 0–14.5)
HCT VFR BLD AUTO: 36.5 % (ref 42–52)
HGB BLD-MCNC: 12.3 G/DL (ref 14–18)
INR BLD: 1.1 (ref 2–3.5)
LYMPHOCYTES # BLD AUTO: 1.1 10*3/UL (ref 1.3–4.4)
LYMPHOCYTES NFR BLD AUTO: 20.6 % (ref 27–41)
MCH RBC QN AUTO: 31.5 PG (ref 27–31)
MCHC RBC AUTO-ENTMCNC: 33.7 G/DL (ref 33–37)
MCV RBC AUTO: 93.4 FL (ref 80–94)
MONOCYTES # BLD AUTO: 0.5 10*3/UL (ref 0.1–1)
MONOCYTES NFR BLD MANUAL: 8.3 % (ref 3–9)
NEUT #: 3.8 10*3/UL (ref 2.3–7.9)
NEUT %: 69.2 % (ref 47–73)
NRBC BLD QL AUTO: 0 10*3/UL (ref 0–0)
PLATELET # BLD AUTO: 217 10*3/UL (ref 130–400)
PMV BLD AUTO: 10.8 FL (ref 9.6–12.3)
POTASSIUM SERPL-SCNC: 4.1 MMOL/L (ref 3.5–5.1)
RBC # BLD AUTO: 3.91 10*6/UL (ref 4.5–5.9)
SODIUM SERPL-SCNC: 136 MMOL/L (ref 136–145)
WBC NRBC COR # BLD AUTO: 5.5 10*3/UL (ref 4.8–10.8)

## 2019-07-02 ENCOUNTER — HOSPITAL ENCOUNTER (EMERGENCY)
Dept: HOSPITAL 83 - ED | Age: 69
Discharge: HOME | End: 2019-07-02
Payer: MEDICARE

## 2019-07-02 VITALS — WEIGHT: 110 LBS | HEIGHT: 66.97 IN | BODY MASS INDEX: 17.27 KG/M2

## 2019-07-02 VITALS — DIASTOLIC BLOOD PRESSURE: 64 MMHG

## 2019-07-02 DIAGNOSIS — N39.0: Primary | ICD-10-CM

## 2019-07-02 DIAGNOSIS — Z91.030: ICD-10-CM

## 2019-07-02 DIAGNOSIS — K59.00: ICD-10-CM

## 2019-07-02 DIAGNOSIS — N18.3: ICD-10-CM

## 2019-07-02 DIAGNOSIS — Z91.041: ICD-10-CM

## 2019-07-02 DIAGNOSIS — Z98.890: ICD-10-CM

## 2019-07-02 DIAGNOSIS — G89.29: ICD-10-CM

## 2019-07-02 DIAGNOSIS — G20: ICD-10-CM

## 2019-07-02 LAB
ALBUMIN SERPL-MCNC: 3.4 GM/DL (ref 3.1–4.5)
ALP SERPL-CCNC: 91 U/L (ref 45–117)
ALT SERPL W P-5'-P-CCNC: 10 U/L (ref 12–78)
APPEARANCE UR: (no result)
AST SERPL-CCNC: 6 IU/L (ref 3–35)
BACTERIA #/AREA URNS HPF: (no result) /[HPF]
BASOPHILS # BLD AUTO: 0 10*3/UL (ref 0–0.1)
BASOPHILS NFR BLD AUTO: 0.2 % (ref 0–1)
BILIRUB UR QL STRIP: NEGATIVE
BUN SERPL-MCNC: 25 MG/DL (ref 7–24)
CHLORIDE SERPL-SCNC: 108 MMOL/L (ref 98–107)
COLOR UR: YELLOW
CREAT SERPL-MCNC: 1.36 MG/DL (ref 0.7–1.3)
EOSINOPHIL # BLD AUTO: 0 10*3/UL (ref 0–0.4)
EOSINOPHIL # BLD AUTO: 0.3 % (ref 1–4)
EPI CELLS #/AREA URNS HPF: (no result) /[HPF]
ERYTHROCYTE [DISTWIDTH] IN BLOOD BY AUTOMATED COUNT: 12.9 % (ref 0–14.5)
GLUCOSE UR QL: NEGATIVE
HCT VFR BLD AUTO: 36.2 % (ref 42–52)
HGB BLD-MCNC: 11.7 G/DL (ref 14–18)
HGB UR QL STRIP: NEGATIVE
KETONES UR QL STRIP: NEGATIVE
LEUKOCYTE ESTERASE UR QL STRIP: (no result)
LIPASE SERPL-CCNC: 102 U/L (ref 73–393)
LYMPHOCYTES # BLD AUTO: 0.5 10*3/UL (ref 1.3–4.4)
LYMPHOCYTES NFR BLD AUTO: 8.9 % (ref 27–41)
MCH RBC QN AUTO: 31.5 PG (ref 27–31)
MCHC RBC AUTO-ENTMCNC: 32.3 G/DL (ref 33–37)
MCV RBC AUTO: 97.3 FL (ref 80–94)
MONOCYTES # BLD AUTO: 0.5 10*3/UL (ref 0.1–1)
MONOCYTES NFR BLD MANUAL: 8.9 % (ref 3–9)
NEUT #: 5 10*3/UL (ref 2.3–7.9)
NEUT %: 81.4 % (ref 47–73)
NITRITE UR QL STRIP: NEGATIVE
NRBC BLD QL AUTO: 0 % (ref 0–0)
PH UR STRIP: 7 [PH] (ref 5–9)
PLATELET # BLD AUTO: 200 10*3/UL (ref 130–400)
PMV BLD AUTO: 10.7 FL (ref 9.6–12.3)
POTASSIUM SERPL-SCNC: 4.2 MMOL/L (ref 3.5–5.1)
PROT SERPL-MCNC: 7.5 GM/DL (ref 6.4–8.2)
RBC # BLD AUTO: 3.72 10*6/UL (ref 4.5–5.9)
SODIUM SERPL-SCNC: 140 MMOL/L (ref 136–145)
SP GR UR: 1.01 (ref 1–1.03)
TROPONIN I SERPL-MCNC: < 0.015 NG/ML (ref ?–0.04)
UROBILINOGEN UR STRIP-MCNC: 1 E.U./DL (ref 0.2–1)
WBC #/AREA URNS HPF: (no result) WBC/HPF (ref 0–5)
WBC NRBC COR # BLD AUTO: 6.1 10*3/UL (ref 4.8–10.8)

## 2020-08-22 ENCOUNTER — HOSPITAL ENCOUNTER (EMERGENCY)
Dept: HOSPITAL 83 - ED | Age: 70
Discharge: HOME | End: 2020-08-22
Payer: MEDICARE

## 2020-08-22 VITALS — SYSTOLIC BLOOD PRESSURE: 95 MMHG | HEIGHT: 60 IN | WEIGHT: 112 LBS | DIASTOLIC BLOOD PRESSURE: 57 MMHG

## 2020-08-22 DIAGNOSIS — S01.112A: Primary | ICD-10-CM

## 2020-08-22 DIAGNOSIS — Z91.030: ICD-10-CM
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
## 2020-08-22 DIAGNOSIS — Z79.899: ICD-10-CM
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
## 2020-08-22 DIAGNOSIS — S61.411A: ICD-10-CM

## 2020-09-21 ENCOUNTER — HOSPITAL ENCOUNTER (OUTPATIENT)
Dept: HOSPITAL 83 - ED | Age: 70
Setting detail: OBSERVATION
LOS: 2 days | Discharge: HOME | End: 2020-09-23
Attending: INTERNAL MEDICINE | Admitting: INTERNAL MEDICINE
Payer: MEDICARE

## 2020-09-21 VITALS — DIASTOLIC BLOOD PRESSURE: 85 MMHG

## 2020-09-21 VITALS — DIASTOLIC BLOOD PRESSURE: 74 MMHG

## 2020-09-21 VITALS — HEIGHT: 65.98 IN | BODY MASS INDEX: 17.16 KG/M2 | WEIGHT: 106.8 LBS

## 2020-09-21 VITALS — DIASTOLIC BLOOD PRESSURE: 80 MMHG

## 2020-09-21 DIAGNOSIS — R79.89: ICD-10-CM

## 2020-09-21 DIAGNOSIS — N18.3: ICD-10-CM

## 2020-09-21 DIAGNOSIS — G20: ICD-10-CM

## 2020-09-21 DIAGNOSIS — R07.89: Primary | ICD-10-CM

## 2020-09-21 DIAGNOSIS — E83.41: ICD-10-CM

## 2020-09-21 DIAGNOSIS — D72.810: ICD-10-CM

## 2020-09-21 DIAGNOSIS — E87.8: ICD-10-CM

## 2020-09-21 DIAGNOSIS — D72.9: ICD-10-CM

## 2020-09-21 DIAGNOSIS — D53.9: ICD-10-CM

## 2020-09-21 DIAGNOSIS — F32.9: ICD-10-CM

## 2020-09-21 DIAGNOSIS — E55.9: ICD-10-CM

## 2020-09-21 LAB
ALBUMIN SERPL-MCNC: 3.3 GM/DL (ref 3.1–4.5)
ALP SERPL-CCNC: 109 U/L (ref 45–117)
ALT SERPL W P-5'-P-CCNC: 7 U/L (ref 12–78)
APTT PPP: 26.8 SECONDS (ref 20–32.1)
AST SERPL-CCNC: 6 IU/L (ref 3–35)
BASOPHILS # BLD AUTO: 0 10*3/UL (ref 0–0.1)
BASOPHILS NFR BLD AUTO: 0.3 % (ref 0–1)
BUN SERPL-MCNC: 22 MG/DL (ref 7–24)
CHLORIDE SERPL-SCNC: 109 MMOL/L (ref 98–107)
CREAT SERPL-MCNC: 1.58 MG/DL (ref 0.7–1.3)
EOSINOPHIL # BLD AUTO: 0.1 10*3/UL (ref 0–0.4)
EOSINOPHIL # BLD AUTO: 1 % (ref 1–4)
ERYTHROCYTE [DISTWIDTH] IN BLOOD BY AUTOMATED COUNT: 14.5 % (ref 0–14.5)
HCT VFR BLD AUTO: 34.1 % (ref 42–52)
INR BLD: 1 (ref 2–3.5)
LIPASE SERPL-CCNC: 81 U/L (ref 73–393)
LYMPHOCYTES # BLD AUTO: 0.8 10*3/UL (ref 1.3–4.4)
LYMPHOCYTES NFR BLD AUTO: 9.6 % (ref 27–41)
MCH RBC QN AUTO: 31 PG (ref 27–31)
MCHC RBC AUTO-ENTMCNC: 31.4 G/DL (ref 33–37)
MCV RBC AUTO: 98.8 FL (ref 80–94)
MONOCYTES # BLD AUTO: 0.6 10*3/UL (ref 0.1–1)
MONOCYTES NFR BLD MANUAL: 7.8 % (ref 3–9)
NEUT #: 6.4 10*3/UL (ref 2.3–7.9)
NEUT %: 80.9 % (ref 47–73)
NRBC BLD QL AUTO: 0 % (ref 0–0)
PLATELET # BLD AUTO: 255 10*3/UL (ref 130–400)
PMV BLD AUTO: 9.8 FL (ref 9.6–12.3)
POTASSIUM SERPL-SCNC: 4.4 MMOL/L (ref 3.5–5.1)
PROT SERPL-MCNC: 7.4 GM/DL (ref 6.4–8.2)
RBC # BLD AUTO: 3.45 10*6/UL (ref 4.5–5.9)
SODIUM SERPL-SCNC: 138 MMOL/L (ref 136–145)
TROPONIN I SERPL-MCNC: < 0.015 NG/ML (ref ?–0.04)
WBC NRBC COR # BLD AUTO: 7.9 10*3/UL (ref 4.8–10.8)

## 2020-09-21 NOTE — NUR
A 69 YEAR OLD MALE PATIENT , admitted to 4E, under the
services of KEILY Sesay DO with a diagnosis of CHEST PAIN R/O MI.
Chief complaint is CHEST PAIN UNDER LEFT AXILLARY AREA, STARTED TODAY, PER
PATIETNS WIFE HE HAS BEEN HITTING THIS AREA OFF ARM OF WHEELCHAIR.
Patient arrived via CART WITH RN from ER.
Monitor applied. Initial assessment completed.
Vital signs taken and recorded.
See assessment for past medical history, medications
and allergies.
Patient and/or family oriented to unit. [facility] [phone]
visitation policy reviewed.
Clothing/patient valuable form completed.
 
FANYN HICKS

## 2020-09-21 NOTE — NUR
PER PATIENTS WIFE, PATIENT HAS NOT HAD A BOWEL MOVEMENT IN 11 DAYS. PER REPORT
FROM ER PATIENT ARRIVED TO THEM WITH BM IN HIS BRIEF AND WHEN PATIENT ARRIVED
TO THE 4TH FLOOR PATIENT HAD SMEARS OF BM ON BRIEF.
WILL PASS ALONG TO

## 2020-09-21 NOTE — NUR
MEDICATION RECONCILLIATION COMPLETED WITH PATIENTS WIFE OVER THE PHONE, STATES
THEY DO NOT SEE A CARDIOLOGIST AND HAVE NO PREFERENCE AS TO WHO THEY WOULD SEE

## 2020-09-22 VITALS — SYSTOLIC BLOOD PRESSURE: 122 MMHG | DIASTOLIC BLOOD PRESSURE: 70 MMHG

## 2020-09-22 VITALS — SYSTOLIC BLOOD PRESSURE: 115 MMHG | DIASTOLIC BLOOD PRESSURE: 80 MMHG

## 2020-09-22 VITALS — DIASTOLIC BLOOD PRESSURE: 90 MMHG

## 2020-09-22 VITALS — DIASTOLIC BLOOD PRESSURE: 67 MMHG

## 2020-09-22 LAB
25(OH)D3 SERPL-MCNC: 16.6 NG/ML (ref 30–100)
ALBUMIN SERPL-MCNC: 3.3 GM/DL (ref 3.1–4.5)
ALP SERPL-CCNC: 105 U/L (ref 45–117)
ALT SERPL W P-5'-P-CCNC: 8 U/L (ref 12–78)
AST SERPL-CCNC: 9 IU/L (ref 3–35)
BASOPHILS # BLD AUTO: 0 10*3/UL (ref 0–0.1)
BASOPHILS NFR BLD AUTO: 0.5 % (ref 0–1)
BUN SERPL-MCNC: 19 MG/DL (ref 7–24)
CHLORIDE SERPL-SCNC: 109 MMOL/L (ref 98–107)
CHOLEST SERPL-MCNC: 152 MG/DL (ref ?–200)
CREAT SERPL-MCNC: 1.41 MG/DL (ref 0.7–1.3)
EOSINOPHIL # BLD AUTO: 0.1 10*3/UL (ref 0–0.4)
EOSINOPHIL # BLD AUTO: 2 % (ref 1–4)
ERYTHROCYTE [DISTWIDTH] IN BLOOD BY AUTOMATED COUNT: 14.5 % (ref 0–14.5)
HCT VFR BLD AUTO: 33.9 % (ref 42–52)
HDLC SERPL-MCNC: 61 MG/DL (ref 40–60)
LDLC SERPL DIRECT ASSAY-MCNC: 67 MG/DL (ref 9–159)
LYMPHOCYTES # BLD AUTO: 1.2 10*3/UL (ref 1.3–4.4)
LYMPHOCYTES NFR BLD AUTO: 19.1 % (ref 27–41)
MCH RBC QN AUTO: 30.9 PG (ref 27–31)
MCHC RBC AUTO-ENTMCNC: 31.6 G/DL (ref 33–37)
MCV RBC AUTO: 98 FL (ref 80–94)
MONOCYTES # BLD AUTO: 0.6 10*3/UL (ref 0.1–1)
MONOCYTES NFR BLD MANUAL: 9.4 % (ref 3–9)
NEUT #: 4.4 10*3/UL (ref 2.3–7.9)
NEUT %: 68.5 % (ref 47–73)
NRBC BLD QL AUTO: 0 % (ref 0–0)
PLATELET # BLD AUTO: 261 10*3/UL (ref 130–400)
PMV BLD AUTO: 9.9 FL (ref 9.6–12.3)
POTASSIUM SERPL-SCNC: 4.3 MMOL/L (ref 3.5–5.1)
PROT SERPL-MCNC: 7.4 GM/DL (ref 6.4–8.2)
RBC # BLD AUTO: 3.46 10*6/UL (ref 4.5–5.9)
SODIUM SERPL-SCNC: 139 MMOL/L (ref 136–145)
TRIGL SERPL-MCNC: 120 MG/DL (ref ?–150)
TSH SERPL DL<=0.005 MIU/L-ACNC: 1.92 UIU/ML (ref 0.36–4.75)
VITAMIN B12: 195 PG/ML (ref 247–911)
VLDLC SERPL CALC-MCNC: 24 MG/DL (ref 6–40)
WBC NRBC COR # BLD AUTO: 6.4 10*3/UL (ref 4.8–10.8)

## 2020-09-22 NOTE — NUR
case management visits with patient, patient unable to carry on a
conversation, will contact patient's wife regarding discharge plans

## 2020-09-22 NOTE — NUR
ADMISSION WOUND PHOTOS TAKEN AT THIS TIME. PATIENT TOLERATED WELL, OPTIFOAM
SACRUM WAS APPLIED TO COCCYX AS PROTECTIVE MEASURE AS RN AWAITS DR ORDER FOR
WOUND CARE.
OPTIFOAM AND VERSATIL APPLIED TO WOUND ON SPONE AS PROTECTIVE MEASURE AS WELL.
PATIENT DENIES PAIN TO THESE SITES.

## 2020-09-22 NOTE — NUR
case management attempted to contact patient's wife regarding discharge plans,
unable to leave a voicemail due to mailbox being full. case management will
attempt at a later time

## 2020-09-23 VITALS — SYSTOLIC BLOOD PRESSURE: 106 MMHG | DIASTOLIC BLOOD PRESSURE: 85 MMHG

## 2020-09-23 VITALS — SYSTOLIC BLOOD PRESSURE: 133 MMHG | DIASTOLIC BLOOD PRESSURE: 83 MMHG

## 2020-09-23 VITALS — DIASTOLIC BLOOD PRESSURE: 62 MMHG

## 2020-09-23 VITALS — DIASTOLIC BLOOD PRESSURE: 61 MMHG

## 2020-09-23 NOTE — NUR
PATIENT DISCHARGED HOME BY [facility] AMBULANCE SERVICE AT THIS TIME, DISCHARGE
INSTRUCTIONS IN CARE OF EMT'S.

## 2020-09-23 NOTE — NUR
PHYSICAL THERAPY
 
Attempted to see pt for evaluation, unvailable out of room for stress test
will follow at a later date.
 
 
Jacquelyn Ellsworth PT

## 2020-09-23 NOTE — NUR
Called patients wife to discuss discharge planning. She stated patient has
services through [facility]. He has [employer] 4 days a week 2
hours per day and always [employer] 1 day a week for 4 hours. [employer] has a nurse call into the home once a month to check on any additional
needs.

## 2020-09-23 NOTE — NUR
PREPARING PATIENT FOR DISCHARGE HOME BY [facility] AMBULANCE SERVICE.  WIFE IS
AWARE THAT AMBULANCE IS EN ROUTE TO THE HOSPITAL, AND PATIENT WILL ARRIVE HOME
SOON.

## 2020-09-23 NOTE — NUR
INFORMED CONSENT SIGNED FOR LEXISCAN STRESS TEST WITH DR. SANDERS.  RESTING EKG
RBBB, HR 89, /78.  PULSE OX 98% AND LUNGS CLEAR BILATERALLY.  COMPLETED
ONE MINUTE OF LEXISCAN PROTOCOL RECEIVING LEXISCAN 0.4MG OVER 10 SECONDS.  NO
ARRHYTHMIAS NOTED.  NONDIAGNOSTIC ST CHANGES PRESENT.  ARTIFACT NOTED D/T PT
HAVING DEEP BRAIN STIMULATOR AND UNABLE TO TURN OFF.  PT HAD NO C/O.  LAST
RECOVERY , /70.  WAITING NUCLEAR SCANNING IN STABLE CONDITION.

## 2020-09-23 NOTE — NUR
Spoke with wife again, stated patient is scheduled for a stress test today and
if that is ok he will be discharged to home. she stated she can't transport
him home, she uses [facility] [employer]. I told her I would call when patient is
discharged and set up so she knows what time to expect him.

## 2020-09-23 NOTE — NUR
PATIENT TO CARDIAC REHAB [facility] FOR STRESS TEST.

## 2020-09-23 NOTE — NUR
Patient not available for Occupational Therapy evaluation as he was out of
his room for a stress test and expected to be gone for 2 hrs per nursing.
PLOF patient is w/c dependent and ADL dependent with non-skilled aides 5
days/wk in the home. OT will recheck at a later date.
Nini Nunez OTR/l

## 2021-01-31 ENCOUNTER — HOSPITAL ENCOUNTER (EMERGENCY)
Dept: HOSPITAL 83 - ED | Age: 71
Discharge: HOME | End: 2021-01-31
Payer: MEDICARE

## 2021-01-31 VITALS — SYSTOLIC BLOOD PRESSURE: 102 MMHG | DIASTOLIC BLOOD PRESSURE: 56 MMHG

## 2021-01-31 VITALS — BODY MASS INDEX: 18.05 KG/M2 | WEIGHT: 115 LBS | HEIGHT: 66.97 IN
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
## 2021-01-31 DIAGNOSIS — Z79.899: ICD-10-CM

## 2021-01-31 DIAGNOSIS — Z91.041: ICD-10-CM

## 2021-01-31 DIAGNOSIS — Z91.030: ICD-10-CM

## 2021-01-31 DIAGNOSIS — S00.212A: Primary | ICD-10-CM

## 2021-01-31 DIAGNOSIS — Z98.890: ICD-10-CM

## 2021-01-31 DIAGNOSIS — G20: ICD-10-CM
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
## 2021-01-31 DIAGNOSIS — N18.30: ICD-10-CM

## 2021-01-31 DIAGNOSIS — F32.9: ICD-10-CM

## 2021-03-09 ENCOUNTER — HOSPITAL ENCOUNTER (EMERGENCY)
Dept: HOSPITAL 83 - ED | Age: 71
Discharge: HOME | End: 2021-03-09
Payer: MEDICARE

## 2021-03-09 VITALS — DIASTOLIC BLOOD PRESSURE: 59 MMHG | SYSTOLIC BLOOD PRESSURE: 90 MMHG

## 2021-03-09 VITALS — WEIGHT: 105 LBS | HEIGHT: 60 IN

## 2021-03-09 DIAGNOSIS — Z91.041: ICD-10-CM
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
## 2021-03-09 DIAGNOSIS — S89.92XA: Primary | ICD-10-CM

## 2021-03-09 DIAGNOSIS — Z98.890: ICD-10-CM

## 2021-03-09 DIAGNOSIS — M25.562: ICD-10-CM

## 2021-03-09 DIAGNOSIS — Z79.899: ICD-10-CM

## 2021-03-09 DIAGNOSIS — Z91.030: ICD-10-CM
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
## 2021-04-19 ENCOUNTER — HOSPITAL ENCOUNTER (EMERGENCY)
Dept: HOSPITAL 83 - ED | Age: 71
Discharge: HOME | End: 2021-04-19
Payer: MEDICARE

## 2021-04-19 VITALS — WEIGHT: 140 LBS | HEIGHT: 60 IN

## 2021-04-19 VITALS — SYSTOLIC BLOOD PRESSURE: 142 MMHG | DIASTOLIC BLOOD PRESSURE: 70 MMHG

## 2021-04-19 DIAGNOSIS — Z91.041: ICD-10-CM
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
## 2021-04-19 DIAGNOSIS — Z91.030: ICD-10-CM
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
## 2021-04-19 DIAGNOSIS — Z79.899: ICD-10-CM
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
## 2021-04-19 DIAGNOSIS — Z98.890: ICD-10-CM
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
## 2021-04-19 DIAGNOSIS — S20.212A: Primary | ICD-10-CM

## 2021-05-04 ENCOUNTER — HOSPITAL ENCOUNTER (EMERGENCY)
Dept: HOSPITAL 83 - ED | Age: 71
Discharge: HOME | End: 2021-05-04
Payer: MEDICARE

## 2021-05-04 VITALS — DIASTOLIC BLOOD PRESSURE: 72 MMHG | SYSTOLIC BLOOD PRESSURE: 136 MMHG

## 2021-05-04 VITALS — BODY MASS INDEX: 16.18 KG/M2 | WEIGHT: 113 LBS | HEIGHT: 70 IN
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
## 2021-05-04 DIAGNOSIS — Z91.041: ICD-10-CM

## 2021-05-04 DIAGNOSIS — Z98.890: ICD-10-CM
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
## 2021-05-04 DIAGNOSIS — Z79.899: ICD-10-CM

## 2021-05-04 DIAGNOSIS — Z91.030: ICD-10-CM

## 2021-05-04 DIAGNOSIS — S01.81XA: Primary | ICD-10-CM
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
## 2021-05-04 DIAGNOSIS — M25.512: ICD-10-CM

## 2021-05-31 ENCOUNTER — HOSPITAL ENCOUNTER (EMERGENCY)
Dept: HOSPITAL 83 - ED | Age: 71
Discharge: HOME | End: 2021-05-31
Payer: MEDICARE

## 2021-05-31 VITALS — DIASTOLIC BLOOD PRESSURE: 49 MMHG | SYSTOLIC BLOOD PRESSURE: 101 MMHG

## 2021-05-31 VITALS — WEIGHT: 132 LBS | HEIGHT: 65.98 IN | BODY MASS INDEX: 21.21 KG/M2

## 2021-05-31 DIAGNOSIS — Z79.899: ICD-10-CM

## 2021-05-31 DIAGNOSIS — Z91.041: ICD-10-CM

## 2021-05-31 DIAGNOSIS — D53.9: ICD-10-CM

## 2021-05-31 DIAGNOSIS — Z91.030: ICD-10-CM

## 2021-05-31 DIAGNOSIS — Z98.890: ICD-10-CM

## 2021-05-31 DIAGNOSIS — N18.9: ICD-10-CM

## 2021-05-31 DIAGNOSIS — N17.9: Primary | ICD-10-CM

## 2021-05-31 LAB
ALBUMIN SERPL-MCNC: 3.5 GM/DL (ref 3.1–4.5)
ALP SERPL-CCNC: 80 U/L (ref 45–117)
ALT SERPL W P-5'-P-CCNC: 6 U/L (ref 12–78)
AST SERPL-CCNC: 14 IU/L (ref 3–35)
BASOPHILS # BLD AUTO: 0 10*3/UL (ref 0–0.1)
BASOPHILS NFR BLD AUTO: 0.1 % (ref 0–1)
BUN SERPL-MCNC: 30 MG/DL (ref 7–24)
CHLORIDE SERPL-SCNC: 110 MMOL/L (ref 98–107)
CK SERPL-CCNC: 322 U/L (ref 39–308)
CREAT SERPL-MCNC: 1.85 MG/DL (ref 0.7–1.3)
EOSINOPHIL # BLD AUTO: 0 10*3/UL (ref 0–0.4)
EOSINOPHIL # BLD AUTO: 0.3 % (ref 1–4)
ERYTHROCYTE [DISTWIDTH] IN BLOOD BY AUTOMATED COUNT: 13.1 % (ref 0–14.5)
HCT VFR BLD AUTO: 33.2 % (ref 42–52)
LYMPHOCYTES # BLD AUTO: 0.6 10*3/UL (ref 1.3–4.4)
LYMPHOCYTES NFR BLD AUTO: 6.5 % (ref 27–41)
MCH RBC QN AUTO: 32.3 PG (ref 27–31)
MCHC RBC AUTO-ENTMCNC: 31.9 G/DL (ref 33–37)
MCV RBC AUTO: 101.2 FL (ref 80–94)
MONOCYTES # BLD AUTO: 0.8 10*3/UL (ref 0.1–1)
MONOCYTES NFR BLD MANUAL: 8.3 % (ref 3–9)
NEUT #: 8 10*3/UL (ref 2.3–7.9)
NEUT %: 84.4 % (ref 47–73)
NRBC BLD QL AUTO: 0 10*3/UL (ref 0–0)
PLATELET # BLD AUTO: 198 10*3/UL (ref 130–400)
PMV BLD AUTO: 9.6 FL (ref 9.6–12.3)
POTASSIUM SERPL-SCNC: 4.7 MMOL/L (ref 3.5–5.1)
PROT SERPL-MCNC: 7.3 GM/DL (ref 6.4–8.2)
RBC # BLD AUTO: 3.28 10*6/UL (ref 4.5–5.9)
SODIUM SERPL-SCNC: 140 MMOL/L (ref 136–145)
WBC NRBC COR # BLD AUTO: 9.5 10*3/UL (ref 4.8–10.8)

## 2021-07-23 ENCOUNTER — HOSPITAL ENCOUNTER (EMERGENCY)
Dept: HOSPITAL 83 - ED | Age: 71
Discharge: HOME | End: 2021-07-23
Payer: MEDICARE

## 2021-07-23 VITALS — DIASTOLIC BLOOD PRESSURE: 72 MMHG

## 2021-07-23 VITALS — HEIGHT: 60 IN
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
## 2021-07-23 DIAGNOSIS — S06.309A: Primary | ICD-10-CM

## 2021-07-23 DIAGNOSIS — Z91.030: ICD-10-CM

## 2021-07-23 DIAGNOSIS — G20: ICD-10-CM

## 2021-07-23 DIAGNOSIS — N18.30: ICD-10-CM

## 2021-07-23 DIAGNOSIS — Z79.899: ICD-10-CM

## 2021-07-23 DIAGNOSIS — F32.9: ICD-10-CM

## 2021-07-23 DIAGNOSIS — S72.002A: ICD-10-CM
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
## 2021-07-23 DIAGNOSIS — Z98.890: ICD-10-CM

## 2021-07-23 DIAGNOSIS — Z91.041: ICD-10-CM

## 2021-07-23 LAB
BASOPHILS # BLD AUTO: 0 10*3/UL (ref 0–0.1)
BASOPHILS NFR BLD AUTO: 0.3 % (ref 0–1)
BUN SERPL-MCNC: 31 MG/DL (ref 7–24)
CHLORIDE SERPL-SCNC: 111 MMOL/L (ref 98–107)
CREAT SERPL-MCNC: 1.75 MG/DL (ref 0.7–1.3)
EOSINOPHIL # BLD AUTO: 0.1 10*3/UL (ref 0–0.4)
EOSINOPHIL # BLD AUTO: 0.5 % (ref 1–4)
ERYTHROCYTE [DISTWIDTH] IN BLOOD BY AUTOMATED COUNT: 12.6 % (ref 0–14.5)
HCT VFR BLD AUTO: 35.7 % (ref 42–52)
LYMPHOCYTES # BLD AUTO: 0.8 10*3/UL (ref 1.3–4.4)
LYMPHOCYTES NFR BLD AUTO: 7.1 % (ref 27–41)
MCH RBC QN AUTO: 31.9 PG (ref 27–31)
MCHC RBC AUTO-ENTMCNC: 32.5 G/DL (ref 33–37)
MCV RBC AUTO: 98.1 FL (ref 80–94)
MONOCYTES # BLD AUTO: 0.9 10*3/UL (ref 0.1–1)
MONOCYTES NFR BLD MANUAL: 8.2 % (ref 3–9)
NEUT #: 8.8 10*3/UL (ref 2.3–7.9)
NEUT %: 83.3 % (ref 47–73)
NRBC BLD QL AUTO: 0 % (ref 0–0)
PLATELET # BLD AUTO: 224 10*3/UL (ref 130–400)
PMV BLD AUTO: 10 FL (ref 9.6–12.3)
POTASSIUM SERPL-SCNC: 4.1 MMOL/L (ref 3.5–5.1)
RBC # BLD AUTO: 3.64 10*6/UL (ref 4.5–5.9)
SODIUM SERPL-SCNC: 137 MMOL/L (ref 136–145)
WBC NRBC COR # BLD AUTO: 10.6 10*3/UL (ref 4.8–10.8)

## 2022-02-18 ENCOUNTER — HOSPITAL ENCOUNTER (OUTPATIENT)
Dept: HOSPITAL 83 - RAD/SH | Age: 72
Discharge: HOME | End: 2022-02-18
Attending: FAMILY MEDICINE
Payer: MEDICARE

## 2022-02-18 DIAGNOSIS — R13.10: Primary | ICD-10-CM

## 2022-03-01 NOTE — NUR
A 67, admitted to ICCU, under the
services of KENNY Arora DO with a diagnosis of SEVERE SEPTIC SHOCK,
HYPOTENTION.
Chief complaint is ABDOMINAL PAIN.
Patient arrived via ambulance from ER.
Monitor applied. Initial assessment completed.
Vital signs taken and recorded.
KENNY ARORA DO notified of admission to the unit.
Orders received.
See assessment for past medical history, medications
and allergies.
Patient and/or family oriented to unit. [facility] ICCU
visitation policy reviewed.
Clothing/patient valuable form completed.
 
DERICK ESPINOSA minimum assist (75% patients effort)

## 2022-03-20 ENCOUNTER — HOSPITAL ENCOUNTER (EMERGENCY)
Dept: HOSPITAL 83 - ED | Age: 72
Discharge: HOME | End: 2022-03-20
Payer: MEDICARE

## 2022-03-20 VITALS — BODY MASS INDEX: 17.68 KG/M2 | WEIGHT: 110 LBS | HEIGHT: 65.98 IN

## 2022-03-20 VITALS — SYSTOLIC BLOOD PRESSURE: 103 MMHG | DIASTOLIC BLOOD PRESSURE: 74 MMHG

## 2022-03-20 DIAGNOSIS — Z91.030: ICD-10-CM

## 2022-03-20 DIAGNOSIS — Z98.890: ICD-10-CM

## 2022-03-20 DIAGNOSIS — M25.571: Primary | ICD-10-CM

## 2022-03-20 DIAGNOSIS — Z91.041: ICD-10-CM

## 2022-03-20 DIAGNOSIS — Z79.899: ICD-10-CM

## 2022-03-27 ENCOUNTER — HOSPITAL ENCOUNTER (EMERGENCY)
Dept: HOSPITAL 83 - ED | Age: 72
Discharge: HOME | End: 2022-03-27
Payer: MEDICARE

## 2022-03-27 DIAGNOSIS — Z98.890: ICD-10-CM

## 2022-03-27 DIAGNOSIS — Z79.899: ICD-10-CM

## 2022-03-27 DIAGNOSIS — M79.602: Primary | ICD-10-CM

## 2022-03-27 DIAGNOSIS — Z91.030: ICD-10-CM

## 2022-03-27 DIAGNOSIS — Z91.041: ICD-10-CM

## 2022-05-15 ENCOUNTER — HOSPITAL ENCOUNTER (EMERGENCY)
Dept: HOSPITAL 83 - ED | Age: 72
Discharge: HOME | End: 2022-05-15
Payer: MEDICARE

## 2022-05-15 VITALS — HEIGHT: 60 IN

## 2022-05-15 VITALS — SYSTOLIC BLOOD PRESSURE: 121 MMHG | DIASTOLIC BLOOD PRESSURE: 70 MMHG

## 2022-05-15 DIAGNOSIS — N18.31: ICD-10-CM

## 2022-05-15 DIAGNOSIS — Z79.899: ICD-10-CM

## 2022-05-15 DIAGNOSIS — Z91.030: ICD-10-CM

## 2022-05-15 DIAGNOSIS — D64.9: ICD-10-CM

## 2022-05-15 DIAGNOSIS — Z91.041: ICD-10-CM

## 2022-05-15 DIAGNOSIS — Z98.890: ICD-10-CM

## 2022-05-15 DIAGNOSIS — M79.605: Primary | ICD-10-CM

## 2022-05-15 LAB
ALP SERPL-CCNC: 73 U/L (ref 45–117)
ALT SERPL W P-5'-P-CCNC: 8 U/L (ref 12–78)
AST SERPL-CCNC: 10 IU/L (ref 3–35)
BASOPHILS # BLD AUTO: 0 10*3/UL (ref 0–0.1)
BASOPHILS NFR BLD AUTO: 0.3 % (ref 0–1)
BUN SERPL-MCNC: 27 MG/DL (ref 7–24)
CHLORIDE SERPL-SCNC: 112 MMOL/L (ref 98–107)
CREAT SERPL-MCNC: 1.69 MG/DL (ref 0.7–1.3)
EOSINOPHIL # BLD AUTO: 0.2 10*3/UL (ref 0–0.4)
EOSINOPHIL # BLD AUTO: 2.7 % (ref 1–4)
ERYTHROCYTE [DISTWIDTH] IN BLOOD BY AUTOMATED COUNT: 13.6 % (ref 0–14.5)
HCT VFR BLD AUTO: 35.2 % (ref 42–52)
LYMPHOCYTES # BLD AUTO: 1.5 10*3/UL (ref 1.3–4.4)
LYMPHOCYTES NFR BLD AUTO: 23.3 % (ref 27–41)
MCH RBC QN AUTO: 31.8 PG (ref 27–31)
MCHC RBC AUTO-ENTMCNC: 32.7 G/DL (ref 33–37)
MCV RBC AUTO: 97.2 FL (ref 80–94)
MONOCYTES # BLD AUTO: 0.8 10*3/UL (ref 0.1–1)
MONOCYTES NFR BLD MANUAL: 12.2 % (ref 3–9)
NEUT #: 3.9 10*3/UL (ref 2.3–7.9)
NEUT %: 61.3 % (ref 47–73)
NRBC BLD QL AUTO: 0 10*3/UL (ref 0–0)
PLATELET # BLD AUTO: 210 10*3/UL (ref 130–400)
PMV BLD AUTO: 10.1 FL (ref 9.6–12.3)
POTASSIUM SERPL-SCNC: 4.6 MMOL/L (ref 3.5–5.1)
PROT SERPL-MCNC: 7.7 GM/DL (ref 6.4–8.2)
RBC # BLD AUTO: 3.62 10*6/UL (ref 4.5–5.9)
SODIUM SERPL-SCNC: 140 MMOL/L (ref 136–145)
WBC NRBC COR # BLD AUTO: 6.3 10*3/UL (ref 4.8–10.8)

## 2022-06-17 ENCOUNTER — HOSPITAL ENCOUNTER (OUTPATIENT)
Dept: HOSPITAL 83 - CARD | Age: 72
Discharge: HOME | End: 2022-06-17
Attending: FAMILY MEDICINE
Payer: MEDICARE

## 2022-06-17 DIAGNOSIS — R07.2: Primary | ICD-10-CM

## 2023-04-01 ENCOUNTER — HOSPITAL ENCOUNTER (EMERGENCY)
Dept: HOSPITAL 83 - ED | Age: 73
LOS: 1 days | Discharge: HOME | End: 2023-04-02
Payer: MEDICARE

## 2023-04-01 VITALS — DIASTOLIC BLOOD PRESSURE: 94 MMHG | SYSTOLIC BLOOD PRESSURE: 168 MMHG

## 2023-04-01 VITALS — BODY MASS INDEX: 20.95 KG/M2 | WEIGHT: 133.5 LBS | HEIGHT: 66.97 IN
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
## 2023-04-01 DIAGNOSIS — Z91.030: ICD-10-CM

## 2023-04-01 DIAGNOSIS — S22.32XA: Primary | ICD-10-CM
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
## 2023-04-01 DIAGNOSIS — Z98.890: ICD-10-CM

## 2023-04-01 DIAGNOSIS — Z91.041: ICD-10-CM
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
## 2023-04-01 DIAGNOSIS — I10: ICD-10-CM
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
## 2023-04-01 DIAGNOSIS — F10.20: ICD-10-CM

## 2023-09-08 ENCOUNTER — HOSPITAL ENCOUNTER (EMERGENCY)
Dept: HOSPITAL 83 - ED | Age: 73
Discharge: NURSING, REHAB OR LONG-TERM CARE | End: 2023-09-08
Payer: COMMERCIAL

## 2023-09-08 VITALS — BODY MASS INDEX: 23.16 KG/M2 | HEIGHT: 65 IN | WEIGHT: 139 LBS

## 2023-09-08 VITALS — DIASTOLIC BLOOD PRESSURE: 78 MMHG | SYSTOLIC BLOOD PRESSURE: 131 MMHG

## 2023-09-08 DIAGNOSIS — Z91.041: ICD-10-CM

## 2023-09-08 DIAGNOSIS — Z91.030: ICD-10-CM

## 2023-09-08 DIAGNOSIS — F17.200: ICD-10-CM

## 2023-09-08 DIAGNOSIS — I10: ICD-10-CM

## 2023-09-08 DIAGNOSIS — F02.80: ICD-10-CM

## 2023-09-08 DIAGNOSIS — Z98.890: ICD-10-CM

## 2023-09-08 DIAGNOSIS — G20: ICD-10-CM

## 2023-09-08 DIAGNOSIS — J18.9: Primary | ICD-10-CM

## 2023-09-08 LAB
ALP SERPL-CCNC: 67 U/L (ref 46–116)
ALT SERPL W P-5'-P-CCNC: < 7 U/L (ref 10–49)
BASOPHILS # BLD AUTO: 0 10*3/UL (ref 0–0.1)
BASOPHILS NFR BLD AUTO: 0.4 % (ref 0–1)
BUN SERPL-MCNC: 27 MG/DL (ref 9–23)
CHLORIDE SERPL-SCNC: 109 MMOL/L (ref 98–107)
EOSINOPHIL # BLD AUTO: 0.2 10*3/UL (ref 0–0.4)
EOSINOPHIL # BLD AUTO: 3 % (ref 1–4)
ERYTHROCYTE [DISTWIDTH] IN BLOOD BY AUTOMATED COUNT: 13.1 % (ref 0–14.5)
HCT VFR BLD AUTO: 34.3 % (ref 42–52)
LYMPHOCYTES # BLD AUTO: 0.7 10*3/UL (ref 1.3–4.4)
LYMPHOCYTES NFR BLD AUTO: 12.5 % (ref 27–41)
MCH RBC QN AUTO: 31.5 PG (ref 27–31)
MCHC RBC AUTO-ENTMCNC: 32.9 G/DL (ref 33–37)
MCV RBC AUTO: 95.5 FL (ref 80–94)
MONOCYTES # BLD AUTO: 0.7 10*3/UL (ref 0.1–1)
MONOCYTES NFR BLD MANUAL: 12.5 % (ref 3–9)
NEUT #: 3.8 10*3/UL (ref 2.3–7.9)
NEUT %: 71.2 % (ref 47–73)
NRBC BLD QL AUTO: 0 % (ref 0–0)
PLATELET # BLD AUTO: 229 10*3/UL (ref 130–400)
PMV BLD AUTO: 10.2 FL (ref 9.6–12.3)
POTASSIUM SERPL-SCNC: 4.2 MMOL/L (ref 3.4–5.1)
PROT SERPL-MCNC: 7.1 GM/DL (ref 6–8)
RBC # BLD AUTO: 3.59 10*6/UL (ref 4.5–5.9)
WBC NRBC COR # BLD AUTO: 5.3 10*3/UL (ref 4.8–10.8)

## 2024-04-18 ENCOUNTER — HOSPITAL ENCOUNTER (EMERGENCY)
Dept: HOSPITAL 83 - ED | Age: 74
Discharge: NURSING, REHAB OR LONG-TERM CARE | End: 2024-04-18
Payer: COMMERCIAL

## 2024-04-18 VITALS — SYSTOLIC BLOOD PRESSURE: 125 MMHG | DIASTOLIC BLOOD PRESSURE: 79 MMHG

## 2024-04-18 VITALS — BODY MASS INDEX: 25.82 KG/M2 | WEIGHT: 140.3 LBS | HEIGHT: 61.97 IN

## 2024-04-18 DIAGNOSIS — E83.51: ICD-10-CM

## 2024-04-18 DIAGNOSIS — F32.A: ICD-10-CM

## 2024-04-18 DIAGNOSIS — D64.9: ICD-10-CM

## 2024-04-18 DIAGNOSIS — I12.9: ICD-10-CM

## 2024-04-18 DIAGNOSIS — Z91.041: ICD-10-CM

## 2024-04-18 DIAGNOSIS — E87.0: ICD-10-CM
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
## 2024-04-18 DIAGNOSIS — E83.41: ICD-10-CM

## 2024-04-18 DIAGNOSIS — Z91.030: ICD-10-CM

## 2024-04-18 DIAGNOSIS — K94.23: Primary | ICD-10-CM

## 2024-04-18 DIAGNOSIS — E78.00: ICD-10-CM

## 2024-04-18 DIAGNOSIS — N18.32: ICD-10-CM

## 2024-04-18 DIAGNOSIS — F03.90: ICD-10-CM

## 2024-04-18 DIAGNOSIS — Z98.890: ICD-10-CM

## 2024-06-05 ENCOUNTER — HOSPITAL ENCOUNTER (OUTPATIENT)
Dept: HOSPITAL 83 - RAD/SH | Age: 74
Discharge: HOME | End: 2024-06-05
Payer: COMMERCIAL

## 2024-06-05 DIAGNOSIS — G20.B2: Primary | ICD-10-CM

## 2024-06-29 ENCOUNTER — HOSPITAL ENCOUNTER (INPATIENT)
Dept: HOSPITAL 83 - 4E | Age: 74
LOS: 6 days | DRG: 871 | End: 2024-07-05
Attending: INTERNAL MEDICINE | Admitting: INTERNAL MEDICINE
Payer: COMMERCIAL

## 2024-06-29 VITALS — SYSTOLIC BLOOD PRESSURE: 104 MMHG | DIASTOLIC BLOOD PRESSURE: 69 MMHG

## 2024-06-29 VITALS — HEIGHT: 70 IN | WEIGHT: 140 LBS | BODY MASS INDEX: 20.04 KG/M2

## 2024-06-29 DIAGNOSIS — J69.0: ICD-10-CM

## 2024-06-29 DIAGNOSIS — G20.A1: ICD-10-CM

## 2024-06-29 DIAGNOSIS — A41.9: Primary | ICD-10-CM

## 2024-06-29 DIAGNOSIS — N39.0: ICD-10-CM

## 2024-06-29 DIAGNOSIS — R62.7: ICD-10-CM

## 2024-06-29 DIAGNOSIS — Z74.01: ICD-10-CM

## 2024-06-30 VITALS — DIASTOLIC BLOOD PRESSURE: 65 MMHG

## 2024-06-30 VITALS — DIASTOLIC BLOOD PRESSURE: 67 MMHG

## 2024-06-30 VITALS — DIASTOLIC BLOOD PRESSURE: 87 MMHG

## 2024-07-01 VITALS — SYSTOLIC BLOOD PRESSURE: 113 MMHG | DIASTOLIC BLOOD PRESSURE: 54 MMHG

## 2024-07-01 VITALS — DIASTOLIC BLOOD PRESSURE: 53 MMHG

## 2024-07-01 VITALS — DIASTOLIC BLOOD PRESSURE: 50 MMHG

## 2024-07-02 VITALS — SYSTOLIC BLOOD PRESSURE: 93 MMHG | DIASTOLIC BLOOD PRESSURE: 51 MMHG

## 2024-07-02 VITALS — DIASTOLIC BLOOD PRESSURE: 66 MMHG | SYSTOLIC BLOOD PRESSURE: 11 MMHG

## 2024-07-02 VITALS — SYSTOLIC BLOOD PRESSURE: 116 MMHG | DIASTOLIC BLOOD PRESSURE: 57 MMHG

## 2024-07-02 VITALS — DIASTOLIC BLOOD PRESSURE: 53 MMHG

## 2024-07-03 VITALS — DIASTOLIC BLOOD PRESSURE: 70 MMHG

## 2024-07-03 VITALS — DIASTOLIC BLOOD PRESSURE: 60 MMHG

## 2024-07-03 VITALS — SYSTOLIC BLOOD PRESSURE: 113 MMHG | DIASTOLIC BLOOD PRESSURE: 68 MMHG

## 2024-07-03 VITALS — DIASTOLIC BLOOD PRESSURE: 80 MMHG | SYSTOLIC BLOOD PRESSURE: 137 MMHG

## 2024-07-04 VITALS — DIASTOLIC BLOOD PRESSURE: 62 MMHG | SYSTOLIC BLOOD PRESSURE: 132 MMHG

## 2024-07-04 VITALS — DIASTOLIC BLOOD PRESSURE: 79 MMHG | SYSTOLIC BLOOD PRESSURE: 126 MMHG

## 2024-07-04 VITALS — SYSTOLIC BLOOD PRESSURE: 107 MMHG | DIASTOLIC BLOOD PRESSURE: 60 MMHG

## 2024-07-04 VITALS — DIASTOLIC BLOOD PRESSURE: 61 MMHG

## 2024-07-05 VITALS — SYSTOLIC BLOOD PRESSURE: 84 MMHG | DIASTOLIC BLOOD PRESSURE: 51 MMHG

## 2024-07-05 VITALS — DIASTOLIC BLOOD PRESSURE: 8 MMHG | SYSTOLIC BLOOD PRESSURE: 10135 MMHG
